# Patient Record
Sex: FEMALE | Race: WHITE
[De-identification: names, ages, dates, MRNs, and addresses within clinical notes are randomized per-mention and may not be internally consistent; named-entity substitution may affect disease eponyms.]

---

## 2017-09-22 ENCOUNTER — HOSPITAL ENCOUNTER (OUTPATIENT)
Dept: HOSPITAL 84 - D.CATH | Age: 57
LOS: 1 days | Discharge: HOME | End: 2017-09-23
Attending: INTERNAL MEDICINE
Payer: MEDICARE

## 2017-09-22 VITALS — SYSTOLIC BLOOD PRESSURE: 106 MMHG | DIASTOLIC BLOOD PRESSURE: 74 MMHG

## 2017-09-22 VITALS — SYSTOLIC BLOOD PRESSURE: 107 MMHG | DIASTOLIC BLOOD PRESSURE: 56 MMHG

## 2017-09-22 VITALS
BODY MASS INDEX: 37.97 KG/M2 | BODY MASS INDEX: 37.97 KG/M2 | HEIGHT: 68 IN | WEIGHT: 250.52 LBS | BODY MASS INDEX: 37.97 KG/M2

## 2017-09-22 VITALS — SYSTOLIC BLOOD PRESSURE: 118 MMHG | DIASTOLIC BLOOD PRESSURE: 64 MMHG

## 2017-09-22 VITALS — SYSTOLIC BLOOD PRESSURE: 110 MMHG | DIASTOLIC BLOOD PRESSURE: 97 MMHG

## 2017-09-22 DIAGNOSIS — I10: ICD-10-CM

## 2017-09-22 DIAGNOSIS — Z01.812: ICD-10-CM

## 2017-09-22 DIAGNOSIS — R94.30: ICD-10-CM

## 2017-09-22 DIAGNOSIS — I48.0: ICD-10-CM

## 2017-09-22 DIAGNOSIS — I25.119: Primary | ICD-10-CM

## 2017-09-22 LAB
ANION GAP SERPL CALC-SCNC: 9.7 MMOL/L (ref 8–16)
BASOPHILS NFR BLD AUTO: 0.4 % (ref 0–2)
BUN SERPL-MCNC: 13 MG/DL (ref 7–18)
CALCIUM SERPL-MCNC: 8 MG/DL (ref 8.5–10.1)
CHLORIDE SERPL-SCNC: 104 MMOL/L (ref 98–107)
CO2 SERPL-SCNC: 28.1 MMOL/L (ref 21–32)
CREAT SERPL-MCNC: 1.2 MG/DL (ref 0.6–1.3)
EOSINOPHIL NFR BLD: 1.9 % (ref 0–7)
ERYTHROCYTE [DISTWIDTH] IN BLOOD BY AUTOMATED COUNT: 13.8 % (ref 11.5–14.5)
GLUCOSE SERPL-MCNC: 96 MG/DL (ref 74–106)
HCT VFR BLD CALC: 39 % (ref 36–48)
HGB BLD-MCNC: 12.9 G/DL (ref 12–16)
IMM GRANULOCYTES NFR BLD: 0.3 % (ref 0–5)
LYMPHOCYTES NFR BLD AUTO: 27.5 % (ref 15–50)
MCH RBC QN AUTO: 30.3 PG (ref 26–34)
MCHC RBC AUTO-ENTMCNC: 33.1 G/DL (ref 31–37)
MCV RBC: 91.5 FL (ref 80–100)
MONOCYTES NFR BLD: 5.3 % (ref 2–11)
NEUTROPHILS NFR BLD AUTO: 64.6 % (ref 40–80)
OSMOLALITY SERPL CALC.SUM OF ELEC: 275 MOSM/KG (ref 275–300)
PLATELET # BLD: 171 10X3/UL (ref 130–400)
PMV BLD AUTO: 10.2 FL (ref 7.4–10.4)
POTASSIUM SERPL-SCNC: 3.8 MMOL/L (ref 3.5–5.1)
RBC # BLD AUTO: 4.26 10X6/UL (ref 4–5.4)
SODIUM SERPL-SCNC: 138 MMOL/L (ref 136–145)
WBC # BLD AUTO: 7 10X3/UL (ref 4.8–10.8)

## 2017-09-22 PROCEDURE — 93459 L HRT ART/GRFT ANGIO: CPT

## 2017-09-22 NOTE — NUR
INITIAL ROUNDS COMPLETED AT 1915 HRS.  PT DENIED ANY DISCOFORT.  ASSESSMETN
COMPLETED AT 2025 HRS.  VSS.  SR PER CM HR 63.  IV TO R HAND SL. R GROIN
CLEAN, DRY AND INTACT WITHOUT ANY BLEEDING, SWELLING OR BRUISING.  LUNGS CTA.
PT REQUESTING PM HOME MEDS.  SR LARA PAGED AT 2040 HRS.  PAGE RETURNS AND
NEW ORDERS RECEIVED AND NOTED.  PM MEDS GINVE. PT CURRENTLY WATCHING TV.
DENIES ANY DISCOMFORT.  SR UP X2,CALL LIGHT WITHIN REACH.

## 2017-09-22 NOTE — HEMODYNAMI
PATIENT:AMOS CANO                                MEDICAL RECORD: [id number]
: 62                                            LOCATION:D.CAT          
ACCT# [id number]                                       ADMISSION DATE: 17
 
 
 Generatedon:201713:25
Patient name: AMOS CANO Patient #: [id number] Visit #: [id number] SSN: :  Date of
study: 2017
Page: Of
Hemodynamic Procedure Report
****************************
Patient Data
Patient Demographics
Procedure consent was obtained
First Name: AMOS            Gender: Female
Last Name: JEROME            : 1960
[facility] Initial: JANETH      Age: 57 year(s)
Patient #: [id number]       Race: 
Visit #: [id number]
Accession #:
[id number]
Additional ID: [id number]
Contact details
Address: [address] [place] [address]  Phone: [phone]
State: [place]
City: [place]
Zip code: [address]
Past Medical History
Allergies: No known allergies
Admission
Admission Data
Admission Date: 2017   Admission Time: 10:39
Admit Source: Other
Lab Results
Lab Result Date: 2017  Lab Result Time: 11:38
Biochemistry
Name         Units    Result                Min      Max
BUN          mg/dl    13       --(--*-)--   7        18
Creatinine   mg/dl    1.2      --(---*)--   0.6      1.3
CBC
Name         Units    Result                Min      Max
Hematocrit   %        39       *-(----)--   42       54
Hemoglobin   g/dl     12.9     -*(----)--   13.5     17.5
Procedure
Procedure Types
Cath Procedure
Diagnostic Procedure
LHC
LHC w/Coronaries w/Grafts
PCI Procedure
SVG-BMS/FRIEDA Initial
Procedure Description
Procedure Date
Procedure Date: 2017
Procedure Start Time: 13:02
Procedure End Time: 13:24
Procedure Staff
Name                            Function
 
Valeriy Mckeon MD                Performing Physician
Daryn Morel RT                  Scrub
Larissa العراقي RT               Scrub
Wild Lara RN                Nurse
Tejinder Rodriguez RT                  Monitor
Procedure Data
Cath Procedure
Fluoroscopy
Diagnostic fluoroscopy      Total fluoroscopy Time: 5.2
time: 5.2 min               min
Diagnostic fluoroscopy      Total fluoroscopy dose:
dose: 1137 mGy              1137 mGy
Contrast Material
Contrast Material Type                       Amount (ml)
Isovue 300                                   114
Entry Location
Entry     Primary  Successful  Side  Size  Upsize Upsize Entry    Closure Succes
sful  Closure
Location                             (Fr)  1 (Fr) 2 (Fr) Remarks  Device        
      Remarks
Femoral                        Right 5 Fr  6 Fr                   Exoseal
artery                                     Short
Estimated blood loss: 10 ml
Diagnostic catheters
Device Type               Used For           End Catheter
Placement
Cordis 5Fr Pigtail        Procedure
Catheter (MP)
Cordis 5Fr JL 4.0         Procedure
Catheter (MP)
Cordis 5Fr 3DRC Catheter  Procedure
(MP)
Procedure Medications
Medication           Administration Route Dosage
Oxygen               NC                   2 l/min
Heparin Flush Bag    added to field       2 bags
(1000units/500ml NS)
0.9% NaCl            I.V.                 100 ml/hr
Fentanyl             I.V.                 50 mcg
Versed               I.V.                 1 mg
Fentanyl             I.V.                 50 mcg
Versed               I.V.                 1 mg
Fentanyl             I.V.                 50 mcg
Fentanyl             I.V.                 50 mcg
Heparin Bolus        I.V.                 4000 units
Hemodynamics
Rest
HGB: 12.9 (g/dl) Heart Rate: 66 (bpm)
Pressure Samples
Time     Site     Value (mmHg) Purpose      Heart      Use
Rate(bpm)
13:05    LV       97/32,40     Snapshot     66
Snapshots
Pre Cath      Intra         NCS           Post Cath
Vital Signs
Time     Heart  Resp   SPO2 etCO2   GI8bxeb NIBP (mmHg)  Rhythm  Pain    Sedatio
n
Rate   (ipm)  (%)  (mmHg)  (mmHg)                       Status  Level
(bpm)
12:52:19 83     17     100  0       0       128/85(109)  NSR     0 (11)  10(A)
 
, No
pain
12:57:12 59     16     100  0       0       122/78(96)   NSR     0 (11)  10(A)
, No
pain
13:02:07 59     18     96   0       0       120/69(113)  NSR     0 (11)  10(A)
, No
pain
13:06:58 63     17     97   0       0       112/74(105)  NSR     0 (11)  10(A)
, No
pain
13:12:48 64     16     92   0       0       143/112(134) NSR     0 (11)  9(A)
, No
pain
13:18:30 65     18     92   0       0       127/90(121)  NSR     0 (11)  9(A)
, No
pain
13:22:30 65     17     94   0       0       148/97(109)  NSR     0 (11)  9(A)
, No
pain
Medications
Time     Medication       Route  Dose  Verified Delivered Reason          Notes 
 Effectiveness
by       by
12:55:15 Oxygen           NC     2     Valeriy Rome    Per physician
l/min Linda Lara RN
12:55:26 Heparin Flush    added  2     Valeriy Rome    used for
Bag              to     bags  Linda Lara RN procedure
(1000units/500ml field
NS)
12:55:39 0.9% NaCl        I.V.   100   Valeriy Rome    Per physician
ml/hr Linda Lara RN
13:01:58 Fentanyl         I.V.   50    Valeriy Rome    for sedation
mcg   Linda Lara RN
13:02:04 Versed           I.V.   1 mg  Valeriy Rome    for sedation
Linda Lara RN
13:03:18 Fentanyl         I.V.   50    Valeriy Rome    for sedation
mcg   Linda Lara RN
13:03:23 Versed           I.V.   1 mg  Valeriy Rome    for sedation
Linda Lara RN
13:06:11 Fentanyl         I.V.   50    Valeriy Rome    for sedation
madyson Lara RN
13:11:47 Fentanyl         I.V.   50    Valeriy Soy    for sedation
[facility] [place]   Linda Lara RN
13:14:06 Heparin Bolus    I.V.   4000  Valeriy Rome    for
units Linda Lara RN anticoagulation
Procedure Log
Time     Note
12:33:06 Informed consent obtained and on chart
12:33:13 Admit Source: Other
12:33:31 Diagnostic Cath status Elective
12:33:34 Wild Lara RN sent for patient. Start room use.
12:33:35 Time tracking: Regular hours
12:33:40 Plan of Care:Hemodynamics will remain stable., Cardiac
rhythm will remain stable., Comfort level will be
maintained., Respiratory function will remain
adequate., Patient/ family verbilizes understanding of
procedure., Procedure tolerated without complication.,
Recovers from procedure without complications..
12:38:54 Lab Result : BUN 13 mg/dl
 
12:38:54 Lab Result : Hemoglobin 12.9 g/dl
12:38:54 Lab Result : Creatinine 1.2 mg/dl
12:38:54 Lab Result : Hematocrit 39 %
12:39:07 H&P Date Dictated: 2017 New H&P dictated by
physician..
12:45:47 Lab results completed and on chart.
12:47:33 Patient received from Pre/Post Procedure Room to CCL 1
Alert and oriented. Tansferred to table in Supine
position.
12:47:36 Warm blankets applied, and akash hugger turned on for
patient comfort.
12:47:38 Correct patient and procedure confirmed by team.
12:51:12 ECG and BP/O2 sat monitors applied to patient.
12:51:14 Vital chart was started
12:51:23 Rhythm: sinus rhythm
12:51:25 Pre-procedure instructions explained to patient.
12:51:34 Pre-op teaching completed and patient verbalized
understanding.
12:51:41 Family in patients room.
12:51:54 Is the patient allergic to Iodine/contrast media? No.
12:51:56 Is patient on blood thinner?Yes
12:52:00 **ACC** The patient was administered the following
blood thiners within the last 24 hours: **ACC**Plavix
12:52:03 Patient diabetic? No.
12:52:15 Patient not pregnant. Patient is over age 55.
12:52:18 ----Pre-sedation anethsthesia assessment.----
12:52:24 Previous problem with sedation/anesthesia? No ?
12:52:34 Snore? Yes
12:52:36 Sleep apnea? No
12:52:54 Deviated septum? No
12:53:00 Opens mouth fully? Yes
12:53:01 Sticks out tongue? Yes
12:53:10 Airway obstruction? No ?
12:53:16 Dentures? Yes IN TIGHT
12:53:49 IV patent on arrival in right forearm with 0.9% NaCl
at [facility].
12:55:02 Right groin area was prepped with chlora-prep and
draped in sterile fashion
12:55:15 Oxygen 2 l/min NC was administered by Wild Lara
RN; Per physician;
12:55:26 Heparin Flush Bag (1000units/500ml NS) 2 bags added to
field was administered by Wild Lara RN; used for
procedure;
12:55:39 0.9% NaCl 100 ml/hr I.V. was administered by Wild Lara RN; Per physician;
12:57:29 Alarms reviewed by R. N.
12:57:30 Sharps counted by scrub and verified by R.N.
12:57:32 Physician arrived
12:57:33 --------ALL STOP TIME OUT------
12:57:33 Final Timeout: patient, procedure, and site verified
with staff and physician. All members of the team are
in agreement.
12:57:38 Right groin site verified by team.
12:57:42 Physical assessment completed. ASA score P 2 - A
patient with mild systemic disease as per Valeriy Mckeon MD.
12:57:47 Sedation plan: IV Moderate Sedation Versed, Fentanyl
12:58:25 Use device set Femoral Dx
12:58:26 Acist Syringe opened to sterile field.
12:58:27 Bag Decanter opened to sterile field.
 
12:58:28 Medline Cath Pack opened to sterile field.
12:58:29 Terumo 5Fr Edelstein Sheath opened to sterile field.
12:58:29 St Patrick 260cm J .035 wire opened to sterile field.
12:58:31 Acist Hand Control opened to sterile field.
12:58:31 Acist Manifold opened to sterile field.
12:58:32 Diagnostic Infinity 5Fr Multipack catheter opened to
sterile field.
12:58:33 Tegaderm 4 x 4 opened to sterile field.
13:01:41 Zero performed for pressure channel P1
13:01:44 Zero performed for pressure channel P1
13:01:58 Fentanyl 50 mcg I.V. was administered by Wild Lara RN; for sedation;
13:02:01 Procedure started.
13:02:01 Full Disclosure recording started
13:02:04 Versed 1 mg I.V. was administered by Wild Lara RN;
for sedation;
13:02:06 Local anesthetic to right femoral artery with
Lidocaine 2% by Valeriy Mckeon MD.**INITIAL ACCESS
ONLY**
13:03:18 Fentanyl 50 mcg I.V. was administered by Wild Lara
RN; for sedation;
13:03:23 Versed 1 mg I.V. was administered by Wild Lara RN;
for sedation;
13:04:45 A 5 Fr sheath was inserted into the Right Femoral
artery
13:05:00 A Cordis 5Fr Pigtail Catheter (MP) was advanced over
the wire and used for Procedure.
13:05:47 LV hemodynamics recorded.
13:05:50 LV gram done using CHOW
13:05:56 EF : 50 %
13:06:06 Catheter removed.
13:06:11 Fentanyl 50 mcg I.V. was administered by Wild Lara
RN; for sedation;
13:06:28 A Cordis 5Fr JL 4.0 Catheter (MP) was advanced over
the wire and used for Procedure.
13:06:43 LCA angiography performed.
13:07:06 Catheter removed.
13:07:55 A Cordis 5Fr 3DRC Catheter (MP) was advanced over the
wire and used for Procedure.
13:08:16 LIMA to LAD angiography performed.
13:08:53 RCA angiography performed.
13:09:01 Catheter removed.
13:09:03 Medtronic Launcher 5Fr AR 2.0 guide catheter opened to
sterile field.
13:09:42 AR 2 ADVANCED
13:09:55 SVG to OM angiography performed.
13:10:49 Merit BasixCompak Inflation Kit opened to sterile
field.
13:10:56 Medtronic Launcher 6Fr AR 2.0 guide catheter opened to
sterile field.
13:11:33 Abbott BMW Universal II J-Tip 190cm wire opened to
sterile field.
13:11:47 Fentanyl 50 mcg I.V. was administered by Wild Lara
RN; for sedation;
13:12:14 Terumo 6Fr Edelstein Sheath opened to sterile field.
13:13:00 SVG to RCA angiography performed.
13:13:03 Catheter removed.
13:13:14 Proceeding to intervention.
13:13:43 Procedure type changed to Cath procedure, Diagnostic
procedure, LHC, LHC w/Coronaries w/Grafts, PCI
 
procedure, SVG-BMS/FRIEDA Initial
13:13:57 Sheath upsized to a 6 Fr Short.
13:14:06 Heparin Bolus 4000 units I.V. was administered by
Wild Lara RN; for anticoagulation;
13:14:10 6 Fr AR 2 guide catheter was inserted over the wire
13:14:48 190 BMW wire advanced.
13:15:05 Wire advanced across lesion.
13:18:00 Inflation Number: 1 A Shankar RX 3.0 x 15 stent was
prepped and advanced across the Aorta Left -> 1st Ob
Vero. The stent was deployed at 13 MONALISA for 0:10
(min:sec).
13:18:06 Stent catheter was removed intact over wire.
13:18:07 Wire removed.
13:18:07 Guide catheter removed.
13:19:05 Cordis 6Fr Exoseal opened to sterile field.
13:19:14 Sheath removed intact; hemostasis achieved with
Exoseal to the Right Femoral artery.
13:19:17 Procedure ended.(Physican Out)
13:20:19 Fluoroscopy time 05.20 minutes.
13:20:25 Flurop Dose total: 1137
13:20:25 Fluoroscopy dose: 1137 mGy
13:20:31 Contrast amount:Isovue 300 114ml.
13:20:33 Sharps counted by scrub and verified by R.N.
13:20:42 Insertion/operative site no bleeding no hematoma.
13:20:46 Post-op/insertion site Right Femoral artery dressed
using a 4 x 4 and Tegaderm.
13:20:51 Post right femoral artery:stable
13:21:34 Post-procedure physical assessment completed. ASA
score P 2 - A patient with mild systemic disease as
per Valeriy Mckeon MD.
13:21:40 Post procedure rhythm: sinus rhythm
13:21:43 Estimated blood loss: 10 ml
13:21:51 Post procedure instruction explained to
patient.Patient verbalizes understanding.
13:21:52 Patient needs reinforcement of post procedure
teaching.
13:21:53 Procedure and supply charges have been captured,
reviewed, submitted and are correct.
13:24:07 Vital chart was stopped
13:24:07 See physician's report for complete and final results.
13:24:10 Report given to PCU.
13:24:19 Patient transfered to PCU with Bed.
13:24:23 Procedure ended.
13:24:23 Full Disclosure recording stopped
13:24:26 End room use (Document Last)
Intervention Summary
Intervention Notes
Time     ActionType  Lesion and  Equipment Action#  Pressure  Duration
Attributes  Used
13:18:00 Place stent Aorta Left  [place] RX   1        13        00:10
-> 1st Ob   3.0 x 15
Vero        stent
Device Usage
Item Name   Manufacture  Quantity  Catalog       Hospital Part     Current Minim
al Lot# /
Number        Charge   Number   Stock   Stock   Serial#
Code
Acist       Acist        1         20230         734734   722874   816969  20
Syringe     Medical
Systems Inc
 
Bag         Microtek     1         S         788242   52333    161849  5
Decanter    Medical Inc.
Medline     Cardinal     1         [id number]     268655 60065    569474  5
Cath Pack   Hotelscan
Terumo 5Fr  Terumo       1         [id number]        954846   509617   138069  40
Edelstein
Sheath
St Patrick     St Patrick      1         387170        596725   480798   519674  30
260cm J
.035 wire
Acist Hand  Acist        1         84091         324997   788009   761495  5
Control     Medical
Systems Inc
Acist       Acist        1         71713         262567   514477   123386  5
Manifold    Medical
Systems Inc
Diagnostic  Cardinal     1         [id number]        853138   71032    483625  30
Infinity    Health
5Fr
Multipack
catheter
Tegaderm 4  3M           1         1626W         232020   352078   670587  5
x 4
Cordis 5Fr  Cardinal     1                                         278344  5
Pigtail     Health
Catheter
(MP)
Cordis 5Fr  Cardinal     1                                         959271  5
JL 4.0      Health
Catheter
(MP)
Cordis 5Fr  Cardinal     1                                         423847  5
3DRC        Health
Catheter
(MP)
Medtronic   Medtronic    1         [id number]       184492   123954   990650  1
Launcher
5Fr AR 2.0
guide
catheter
Merit       Merit        1         [id number]        395411   456345   059095  15
BasixCompak Medical
Inflation
Kit
Medtronic   Medtronic    1         [id number]       512590   22363    230651  1
Launcher
6Fr AR 2.0
guide
catheter
Abbott BMW  Abbott       1         [id number]      589764   14122    469401  5
[place]   Vascular
II J-Tip
190cm wire
Terumo 6Fr  Terumo       1         [id number]        925201   938013   525381  40
Edelstein
Sheath
Shankar RX 3.0 Medtronic    1         [id number]  827754   0352537  302581  5    
   [id number]
x 15 stent
Cordis 6Fr  Cardinal     1                  262066   650243   003114  10
 
[facility]
Signature Audit [place]
Stage           Time        Signature      Unsigned
Intra-Procedure 2017   Tejinder Rodriguez
1:25:24 PM  RT(R) (CV)
Signatures
Monitor : Tejinder Rodriguez RT Signature :
______________________________
Date : ______________ Time :
______________
 
 
 
 
 
 
 
 
 
 
 
 
 
 
 
 
 
 
 
 
 
 
 
 
 
 
 
 
 
 
 
 
 
 
 
 
 
 
 
 
 
 
 
 
 
[facility]                                          
[address] [place] [address]

## 2017-09-22 NOTE — HP
PATIENT: AMOS CANO                               MEDICAL RECORD: [id number]
ACCOUNT: [id number]                                    LOCATION:D.CAT         
: 62                                            ADMISSION DATE: 17
                                                         
 
                             HISTORY AND PHYSICAL EXAMINATION
 
 
DIAGNOSES:
1.  Angina.
2.  Abnormal nuclear stress test.
3.  Paroxysmal atrial fibrillation.
4.  Hypertension.
 
HISTORY OF PRESENT ILLNESS:  Ms. Cano presents with anginal chest discomfort. 
Underwent risk stratification with stress testing revealing anterior perfusion
defect, now brought for cardiac catheterization.
 
REVIEW OF SYSTEMS:  The patient reports easy bruising but reports no swollen
glands.  The patient reports no fever, no night sweats, no significant weight
gain, no significant weight loss.  No significant exercise tolerance.  The
patient reports no dry eyes, no irritation, no vision change.  Patient reports
no difficulty hearing and no ear pain.  Patient reports no frequent nose bleeds
or nose and sinus problems.  Patient reports no arm pain on exertion.  No
shortness of breath while lying down.  No history of heart murmur.  Patient
reports no cough, no wheezing or coughing up blood.  Patient reports no
abdominal pain, no vomiting.  Normal appetite.  No diarrhea and not vomiting
blood.  No nausea and no constipation.  Patient reports no incontinence.  No
difficulty urinating.  No hematuria.  No increased frequency.  Patient reports
no muscle aches.  No weakness, no arthralgias, no back pain.  No swelling of the
extremities.  Patient reports no abnormal mole, no jaundice, no rashes.  Reports
no loss of consciousness.  No weakness and no numbness.  No seizures, dizziness,
or headaches.  The patient reports no depression, no sleep disturbance, feeling
safe in a relationship and no alcohol abuse.  Patient reports on fatigue. 
Reports no runny nose or sinus pressure.  No itching, no hives, and no frequent
sneezing.
 
PHYSICAL EXAMINATION:
GENERAL APPEARANCE:  Well-nourished, well-developed, appears stated age.  Level
of distress, comfortable.
PSYCHIATRIC:  Mental status, alert, normal affect.  Orientation, oriented to
time, place and person.
EYES:  Lids and conjunctiva, noninjected.  No discharge, no pallor.
ENT:  Lips, teeth, gums, normal dentition.  Oropharynx, no cyanosis, no pallor.
NECK:  Carotid arteries, bilateral normal upstroke, no bruits, no thrills.
JUGULAR VEINS:  No jugular venous pressure or distention.
CERVICAL LYMPH NODES:  Nontender, nonenlarged.
THYROID:  Not enlarged.  Nontender.  No nodules.
LUNGS:  Respiratory effort, unlabored.
CHEST:  Normal curvature.  No thoracic deformity.  No chest wall tenderness. 
Percussion, resonant.  Auscultation, clear.  No wheezes, no rales, no rhonchi.
CARDIOVASCULAR:  Precordial exam, nondisplaced.  No heaves or pericardial
thrills.  Rate and rhythm, regular.  Heart sounds, normal S1, normal S2.  No S3,
no gallop, no rub.  Systolic murmur, not heard.  Diastolic murmur, not heard.
EXTREMITIES:  No cyanosis, no edema.  Peripheral pulses, full and equal in all
extremities, except as noted.  No bruits appreciated.
ABDOMEN:  Soft, nondistended.  Normal aorta.  No bruit.  Nontender.  No masses. 
Liver, nontender, no hepatomegaly.  Spleen, nontender, no splenomegaly.
 
 
 
HISTORY AND PHYSICAL                           [id number]    AMOS CANO       
 
 
MUSCULOSKELETAL:  No joint tenderness.  No joint swelling.  No erythema.
NEUROLOGICAL:  Normal gait, normal strength, normal tone.
SKIN:  Warm and dry.
 
OVERALL IMPRESSION:  Chest pain compatible with angina and abnormal nuclear
stress test.  Most likely, she has hemodynamically significant coronary artery
disease.  We will proceed with coronary angiography.  Further care depends upon
findings of the angiography.
 
TRANSINT:[id number] Voice Confirmation ID: [id number] DOCUMENT ID: [id number]
 
 
                                           
                                           JEFF LABOY MD             
 
 
 
 
 
 
 
 
 
 
 
 
 
 
 
 
 
 
 
 
 
 
 
 
 
 
 
 
 
 
 
 
 
CC:                                                             [id number]
DICTATION DATE: 17 1258     :     17 1324      [facility]                                          
[address] [place] [address]

## 2017-09-22 NOTE — OP
PATIENT NAME:  AMOS CANO                         MEDICAL RECORD: [id number]
:62                                             LOCATION:D.M2     D.2122
                                                         ADMISSION DATE:        
SURGEON:  JEFF LABOY MD             
 
 
DATE OF OPERATION:  2017
 
PROCEDURES:
1.  PTCA stent left circumflex through the vein graft.
2.  Left heart catheterization.
3.  Selective coronary angiography.
4.  Vein graft angiography.
5.  LIMA angiography.
 
INDICATION:  Unstable angina.
 
PROCEDURE IN DETAIL:  After informed consent was obtained and after detailed
explanation of risks, benefits as well as alternative therapies, the patient
elected to proceed with angiogram and angioplasty.  The right femoral area was
prepped and draped in normal sterile fashion.  The right femoral artery was
cannulated via modified Seldinger technique with placement of 6-Stateless sheath. 
All catheters exchanged through this sheath.
 
FINDINGS:  The left ventriculogram was performed in standard 30-degree CHOW view,
reveals good cardiac wall motion throughout all segments.  Overall ejection
fraction estimated at 50%.
 
SELECTIVE CORONARY ANGIOGRAPHY:
1.  Left main showed no significant angiographic disease.
2.  Left anterior descending has 90% stenosis proximally.
3.  Vein graft to the LAD diagonal is widely patent.
4.  LIMA to the distal LAD is widely patent.
5.  Left circumflex is totally occluded.
6.  Vein graft to the circumflex is patent; however, there is an 80% stenosis in
the circumflex after the vein graft.
7.  Right coronary artery is totally occluded.
8.  Vein graft to the right coronary artery is widely patent.  Distal right
coronary artery is widely patent.
 
PTCA STENT OF THE LEFT CIRCUMFLEX THROUGH THE VEIN GRAFT:  The stent used was a
3.0 x 15 mm [place].  Result was 0% residual stenosis.
 
OVERALL IMPRESSION:  Successful percutaneous transluminal coronary angioplasty
stent of the left circumflex through the patent vein graft going from 85%
initial stenosis to 0% residual.
 
TRANSINT:[id number] Voice Confirmation ID: [id number] DOCUMENT ID: [id number]
                                           
                                           JFEF LABOY MD             
CC:                                                             [id number]
DICTATION DATE: 17 1321     :     17 1452      [facility]                                          
[address] [place] [address]

## 2017-09-23 VITALS — SYSTOLIC BLOOD PRESSURE: 110 MMHG | DIASTOLIC BLOOD PRESSURE: 66 MMHG

## 2017-09-23 VITALS — SYSTOLIC BLOOD PRESSURE: 94 MMHG | DIASTOLIC BLOOD PRESSURE: 55 MMHG

## 2017-09-23 VITALS — SYSTOLIC BLOOD PRESSURE: 96 MMHG | DIASTOLIC BLOOD PRESSURE: 61 MMHG

## 2017-09-23 NOTE — NUR
WENT OVER DC PAPERWORK WITH PT PT VERBALIZES UNDERSTANDING. DC IV WITH CATH
TIP INTACT DC TELE AND RETURNED TO MONITOR TECH. PT HAS WRITTEN SCRIPTS.
WAITING ON HER DAUGHTER TO PICK HER UP

## 2019-02-08 ENCOUNTER — HOSPITAL ENCOUNTER (OUTPATIENT)
Dept: HOSPITAL 84 - D.CATH | Age: 59
Discharge: HOME | End: 2019-02-08
Attending: INTERNAL MEDICINE
Payer: MEDICARE

## 2019-02-08 VITALS — DIASTOLIC BLOOD PRESSURE: 64 MMHG | SYSTOLIC BLOOD PRESSURE: 102 MMHG

## 2019-02-08 VITALS
WEIGHT: 240.5 LBS | BODY MASS INDEX: 36.45 KG/M2 | HEIGHT: 68 IN | WEIGHT: 240.5 LBS | BODY MASS INDEX: 36.45 KG/M2 | HEIGHT: 68 IN

## 2019-02-08 DIAGNOSIS — Z01.812: ICD-10-CM

## 2019-02-08 DIAGNOSIS — Z95.1: ICD-10-CM

## 2019-02-08 DIAGNOSIS — Z95.5: ICD-10-CM

## 2019-02-08 DIAGNOSIS — I25.119: Primary | ICD-10-CM

## 2019-02-08 LAB
ANION GAP SERPL CALC-SCNC: 13 MMOL/L (ref 8–16)
BUN SERPL-MCNC: 13 MG/DL (ref 7–18)
CALCIUM SERPL-MCNC: 8.3 MG/DL (ref 8.5–10.1)
CHLORIDE SERPL-SCNC: 106 MMOL/L (ref 98–107)
CO2 SERPL-SCNC: 28 MMOL/L (ref 21–32)
CREAT SERPL-MCNC: 0.9 MG/DL (ref 0.6–1.3)
ERYTHROCYTE [DISTWIDTH] IN BLOOD BY AUTOMATED COUNT: 13.9 % (ref 11.5–14.5)
GLUCOSE SERPL-MCNC: 89 MG/DL (ref 74–106)
HCT VFR BLD CALC: 39.9 % (ref 36–48)
HGB BLD-MCNC: 13.1 G/DL (ref 12–16)
LYMPHOCYTES NFR BLD AUTO: 38.6 % (ref 15–50)
MCH RBC QN AUTO: 29.6 PG (ref 26–34)
MCHC RBC AUTO-ENTMCNC: 32.8 G/DL (ref 31–37)
MCV RBC: 90.1 FL (ref 80–100)
NEUTROPHILS NFR BLD AUTO: 52.8 % (ref 40–80)
OSMOLALITY SERPL CALC.SUM OF ELEC: 283 MOSM/KG (ref 275–300)
PLATELET # BLD: 189 10X3/UL (ref 130–400)
PMV BLD AUTO: 9.4 FL (ref 7.4–10.4)
POTASSIUM SERPL-SCNC: 4 MMOL/L (ref 3.5–5.1)
RBC # BLD AUTO: 4.43 10X6/UL (ref 4–5.4)
SODIUM SERPL-SCNC: 143 MMOL/L (ref 136–145)
WBC # BLD AUTO: 4.6 10X3/UL (ref 4.8–10.8)

## 2019-02-08 NOTE — NUR
1230 HOB IS ELEVATED 30 DEGREES, DRESSING TO RIGHT GROIN IS CDI, AREA
IS SOFT AND NONTENDER. PEDAL PULSES PALPABLE. PT IS ALERT AND DENIES
NEEDS AT THIS TIME.
1244 HOB FULLY ELEVATED AND SANDWICH SERVED. DRESSING REMAINS CDI TO
RIGHT GROIN, AREA IS SOFT AND NONTENDER. PEDAL PULSES PALPABLE.
FAMILY AT BEDSIDE, CALL LIGHT IN REACH.

## 2019-02-08 NOTE — NUR
HOB IS FLAT, VSS, DRESSING CDI TO RIGHT GROIN, AREA IS SOFT AND
NONTENDER. PEDAL PULSES PALPABLE. RESP WITH EASE. PT DENIES NEEDS AT
THIS TIME.

## 2019-02-08 NOTE — NUR
1128 RECEIVED PT  FROMA CATH LAB. PT IS SLEEPING AND AWAKENS EASILY
TO VERBAL. DRESSING CDI TO RIGHT GROIN, AREA IS SOFT AND NONTENDER.
PEDAL PULSES PALPABLE. VSS, HOB IS FLAT, FAMILY AT BEDSIDE.

## 2019-02-08 NOTE — HEMODYNAMI
PATIENT:AMOS CANO                                MEDICAL RECORD: [id number]
: 62                                            LOCATION:D.CAT          
ACCT# [id number]                                       ADMISSION DATE: 19
 
 
 Generatedon:201911:19
Patient name: AMOS CANO Patient #: [id number] Visit #: [id number] SSN: :  Date
of study: 2019
Page: Of
Hemodynamic Procedure Report
****************************
Patient Data
Patient Demographics
Procedure consent was obtained
First Name: AMOS            Gender: Female
Last Name: JEROME            : 1960
[facility] Initial: JANETH      Age: 58 year(s)
Patient #: [id number]       Race: 
Visit #: [id number]
Accession #:
[id number]
Additional ID: [id number]
Contact details
Address: [address] [place] [address]  Phone: [phone]
State: [place]
City: [place]
Zip code: [address]
Past Medical History
Allergies: No known allergies
Admission
Admission Data
Admission Date: 2019    Admission Time: 8:45
Height (in.): 68            BSA: 2.2 (m2)
Height (cm.): 172.72        BMI: 36.34 (kg/m2)
Weight (lbs.): 239
Weight (kg.): 108.41
Lab Results
Lab Result Date: 2019   Lab Result Time: 9:30
Biochemistry
Name         Units    Result                Min      Max
BUN          mg/dl    13       --(--*-)--   7        18
Creatinine   mg/dl    0.9      --(-*--)--   0.6      1.3
CBC
Name         Units    Result                Min      Max
Hematocrit   %        39.9     -*(----)--   42       54
Hemoglobin   g/dl     13.1     -*(----)--   13.5     17.5
Procedure
Procedure Types
Cath Procedure
Diagnostic Procedure
LHC
LHC w/Coronaries w/Grafts
Sedation Charges
Moderate Sedation up to 15 minutes
Procedure Description
Procedure Date
Procedure Date: 2019
Procedure Start Time: 11:04
 
Procedure End Time: 11:19
Procedure Staff
Name                            Function
Valeriy Mckeon MD                Performing Physician
Yobany Sotelo RT                  Monitor
Larissa العراقي RT               Scrub
Alaina Baum RN                Nurse
Procedure Data
Cath Procedure
Fluoroscopy
Diagnostic fluoroscopy      Total fluoroscopy Time: 3.9
time: 3.9 min               min
Diagnostic fluoroscopy      Total fluoroscopy dose: 819
dose: 819 mGy               mGy
Contrast Material
Contrast Material Type                       Amount (ml)
Isovue 300                                   62
Entry Location
Entry     Primary  Successful  Side  Size  Upsize Upsize Entry    Closure Succes
sful  Closure
Location                             (Fr)  1 (Fr) 2 (Fr) Remarks  Device        
      Remarks
Femoral                        Right 5 Fr                         Exoseal
artery
Estimated blood loss: 5 ml
Diagnostic catheters
Device Type               Used For           End Catheter
Placement
MULTIPACK Pigtail 5 Fr    Procedure
catheter
MULTIPACK JL 4.0 5Fr      Procedure
catheter
DIAGNOSTIC AR2 MOD 5 Fr   Procedure
catheter (372569F)
DIAGNOSTIC IM 5Fr         Procedure
catheter (052950Y)
Procedure Complications
No complications
Procedure Medications
Medication           Administration Route Dosage
0.9% NaCl            I.V.                 100 ml/hr
Oxygen               etCO2 Nasal cannula  2 l/min
Lidocaine 2%         added to field       20
Heparin Flush Bag    added to field       2 bags
(1000units/500ml NS)
Versed               I.V.                 2 mg
Fentanyl             I.V.                 50 mcg
Versed               I.V.                 2 mg
Fentanyl             I.V.                 50 mcg
Hemodynamics
Rest
BSA: 2.2 (m2) HGB: 13.1 (g/dl) O2 Consumption: Estimated: 191.78 (ml/min) O2 Con
sumption
indexed: Estimated:87.17 (ml/min/m) Heart Rate: 48 (bpm)
Snapshots
Pre Cath      Intra         NCS           Post Cath
Vital Signs
Time     Heart  Resp   SPO2 etCO2   NIBP       Rhythm  Pain    Sedation
Rate   (ipm)  (%)  (mmHg)  (mmHg)             Status  Level
(bpm)
 
10:34:52 48     14     98   32.9    119/70(97) SB      0 (11)  10(A)
, No
pain
10:39:10 47     11     100  34.4    122/70(93) SB      0 (11)  10(A)
, No
pain
10:43:28 50     13     99   26      114/72(82) SB      0 (11)  10(A)
, No
pain
10:47:40 50     13     97   29.8    108/68(82) SB      0 (11)  10(A)
, No
pain
10:51:54 50     17     98   32.1    104/67(87) SB      0 (11)  10(A)
, No
pain
10:56:08 50     12     97   38.3    110/65(79) SB      0 (11)  10(A)
, No
pain
11:00:24 49     17     98   34.4    105/65(85) SB      0 (11)  10(A)
, No
pain
11:04:34 52     11     96   39.8    109/71(88) SB      0 (11)  10(A)
, No
pain
11:08:47 53     10     97   39      114/68(95) SB      0 (11)  9(A)
, No
pain
11:12:57 55     27     97   39.8    107/75(91) SB      0 (11)  10(A)
, No
pain
11:17:09 56     12     97   37.5    119/72(92) SB      0 (11)  10(A)
, No
pain
Medications
Time     Medication       Route   Dose  Verified Delivered Reason     Notes  Eff
ectiveness
by       by
10:32:40 0.9% NaCl        I.V.    100   Valeriy  Alaina     used for
ml/hr Linda Baum   procedure
RN
10:32:46 Oxygen           etCO2   2     Valeriy  Alaina     used for
Nasal   l/min Linda Baum   procedure
cannula                RN
10:33:01 Lidocaine 2%     added   20ml  Valeriy  Alaina     for local
to      vial  Linda Baum   anesthetic
field                  RN
10:33:06 Heparin Flush    added   2     Valeriy  Alaina     used for
Bag              to      bags  Linda Baum   procedure
(1000units/500ml field                  RN
NS)
10:58:39 Versed           I.V.    2 mg  Valeriy  Alaina     for
Linda Baum   sedation
RN
10:58:46 Fentanyl         I.V.    50    Valeriy  Alaina     for
mcg   Linda Baum   sedation
RN
11:04:17 Versed           I.V.    2 mg  Valeriy  Alaina     for
Linda Baum   sedation
RN
11:04:21 Fentanyl         I.V.    50    Valeriy  Alaina     for
 
mcg   Linda Baum   sedation
RN
Procedure Log
Time     Note
10:19:28 Signed procedure consent form obtained from patient.
10:19:29 Diagnostic Cath status Elective
10:19:30 Time tracking: Regular hours (M-F 7:00 - 5:00)
10:19:34 Plan of Care:Hemodynamics will remain stable., Cardiac
rhythm will remain stable., Comfort level will be
maintained., Respiratory function will remain
adequate., Patient/ family verbilizes understanding of
procedure., Procedure tolerated without complication.,
Recovers from procedure without complications..
10:19:45 H&P Date Dictated: 2019 Within 30 days and on
chart., H&P Addendum completed by physician on day of
procedure. (MUST COMPLETE FOR ALL OUTPATIENTS).
10:19:51 Patient allergic to No known allergies
10:20:02 Patient Height : 68 inches
10:20:06 Patient Weight : 239 lbs
10:20:10 Yobany Sotelo RT(R) sent for patient. Start room use.
10:26:38 Patient received from Pre/Post Procedure Room to CCL 1
Alert and oriented. Tansferred to table in Supine
position.
10:26:39 Warm blankets applied, and akash hugger turned on for
patient comfort.
10:26:39 Correct patient and procedure confirmed by team.
10:26:40 ECG and BP/O2 sat monitors applied to patient.
10:32:29 Vital chart was started
10:32:40 0.9% NaCl 100 ml/hr I.V. was administered by Alaina Baum RN; used for procedure;
10:32:46 Oxygen 2 l/min etCO2 Nasal cannula was administered by
Alaina Baum RN; used for procedure;
10:33:01 Lidocaine 2% 20ml vial added to field was administered
by Alaina Baum RN; for local anesthetic;
10:33:06 Heparin Flush Bag (1000units/500ml NS) 2 bags added to
field was administered by Alaina Baum RN; used for
procedure;
10:40:37 Baseline sample Acquired.
10:40:40 Rhythm: sinus rhythm
10:40:45 Full Disclosure recording started
10:40:54 Pre-procedure instructions explained to patient.
10:40:54 Pre-op teaching completed and patient verbalized
understanding.
10:41:15 Family in waiting room.
10:41:17 Patient NPO since Midnight.
10:41:18 Is the patient allergic to Iodine/contrast media? No.
10:41:27 Is patient on blood thinner?Yes
10:41:29 **ACC** The patient was administered the following
blood thiners within the last 24 hours: **ACC**Plavix
10:41:35 Patient diabetic? No.
10:41:38 Previous problem with sedation/anesthesia? No ?
10:41:45 Snore? Yes
10:41:50 Sleep apnea? No
10:41:51 Deviated septum? No
10:41:51 Opens mouth fully? Yes
10:41:52 Sticks out tongue? Yes
10:41:54 Airway obstruction? No ?
10:41:58 Dentures? Yes in tight
10:42:02 Pre procedure: right dorsailis pedis pulse 2+ Normal;
easily identifiable; not easily obliterated
 
10:42:05 Patient pain scale 0/10 ?.
10:42:11 IV patent on arrival in left forearm with 0.9% NaCl at
[facility].
10:43:29 Lab Result : Creatinine 0.9 mg/dl
10:43:29 Lab Result : BUN 13 mg/dl
10:43:29 Lab Result : Hematocrit 39.9 %
10:43:29 Lab Result : Hemoglobin 13.1 g/dl
10:43:31 Lab results completed and on chart.
10:43:34 Right groin area was prepped with chlora-prep and
draped in sterile fashion
10:43:35 Alarms reviewed by R. N.
10:43:35 Sharps counted by scrub and verified by R.N.
10:43:37 Use device set Femoral Dx
10:43:38 ACIST Syringe (46677) opened to sterile field.
10:43:39 Bag Decanter (2002S) opened to sterile field.
10:43:39 Medline Cath Pack ([id number]) opened to sterile field.
10:43:40 ACIST Hand Control (87947) opened to sterile field.
10:43:40 ACIST Manifold (49316) opened to sterile field.
10:43:42 Tegaderm 4 x 4 (1626W) opened to sterile field.
10:43:43 SHEATH 5FR Simla ([id number]) opened to sterile field.
10:43:44 DIAGNOSTIC WIRE .035 260cm J wire (010879) opened to
sterile field.
10:43:45 DIAGNOSTIC Multipack 5Fr catheter set ([id number]) opened
to sterile field.
10:44:12 Zero performed for pressure channel P1
10:58:06 Physician arrived
10:58:07 --------ALL STOP TIME OUT------
10:58:07 Final Timeout: patient, procedure, and site verified
with staff and physician. All members of the team are
in agreement.
10:58:09 Right groin site verified by team.
10:58:12 Fire Safety Assessment: A--An alcohol-based skin
anteseptic being used preoperatively., C--Open oxygen
or nitrous oxide is being used., D--An ESU, laser, or
fiber-optic light is being used.
10:58:15 Physical assessment completed. ASA score P 2 - A
patient with mild systemic disease as per Valeriy Mckeon MD.
10:58:17 Sedation plan: IV Moderate Sedation Medication:Versed,
Fentanyl
10:58:39 Versed 2 mg I.V. was administered by Alaina Baum RN;
for sedation;
10:58:46 Fentanyl 50 mcg I.V. was administered by Alaina Baum
RN; for sedation;
11:04:17 Versed 2 mg I.V. was administered by Alaina Baum RN;
for sedation;
11:04:21 Fentanyl 50 mcg I.V. was administered by Alaina Baum
RN; for sedation;
11:04:30 Procedure started.
11:04:33 Local anesthetic to right femoral artery with
Lidocaine 2% by Valeriy Mckeon MD.**INITIAL ACCESS
ONLY**
11:04:44 A 5 Fr sheath was inserted into the Right Femoral
artery
11:05:45 A MULTIPACK Pigtail 5 Fr catheter was advanced over
the wire and used for Procedure.
11:06:18 LV gram done using CHOW
11:06:21 Injector settings: Ml/sec: 10, Volume: 20,
11:06:22 LV hemodynamics recorded.
11:06:26 EF : 50 %
 
11:06:28 Catheter exchanged over wire.
11:06:36 A MULTIPACK JL 4.0 5Fr catheter was advanced over the
wire and used for Procedure.
11:07:41 LCA angiography performed.
11:08:04 Catheter exchanged over wire.
11:08:48 A DIAGNOSTIC AR2 MOD 5 Fr catheter ([id number]) was
advanced over the wire and used for Procedure.
11:09:36 SVG to OM angiography performed.
11:09:53 SVG to Circ angiography performed.
11:10:36 RCA angiography performed.
11:12:53 SVG to RCA angiography performed.
11:13:05 A DIAGNOSTIC IM 5Fr catheter ([id number]) was advanced
over the wire and used for Procedure.
11:14:46 LIMA to LAD angiography performed.
11:14:49 Catheter removed.
11:14:55 EXOSEAL 5Fr () opened to sterile field.
11:15:02 Sheath removed intact; hemostasis achieved with
Exoseal to the Right Femoral artery.
11:15:04 Procedure ended.(Physican Out)
11:16:30 Fluoroscopy time 03.90 minutes.
11:16:36 Flurop Dose total: 819
11:16:36 Fluoroscopy dose: 819 mGy
11:16:40 Contrast amount:Isovue 300 62ml.
11:16:41 Sharps counted by scrub and verified by R.N.
11:16:42 Insertion/operative site no bleeding no hematoma.
11:16:45 Post-op/insertion site Right Femoral artery dressed
using a 4 x 4 and Tegaderm.
11:16:48 Post right femoral artery:stable, soft, clean and dry
11:16:49 Post Procedure Pulses reassessed and unchanged
11:16:51 Post-procedure physical assessment completed. ASA
score P 2 - A patient with mild systemic disease as
per Valeriy Mckeon MD.
11:17:04 Post procedure rhythm: sinus rhythm
11:17:29 Estimated blood loss: 5 ml
11:17:31 Post procedure instruction explained to
patient.Patient verbalizes understanding.
11:17:31 Patient needs reinforcement of post procedure
teaching.
11:18:12 Procedure type changed to Cath procedure, Diagnostic
procedure, LHC, LHC w/Coronaries w/Grafts, Sedation
Charges, Moderate Sedation up to 15 minutes
11:18:50 Procedure and supply charges have been captured,
reviewed, submitted and are correct.
11:18:50 Vital chart was stopped
11:18:54 Procedure Complication : No complications
11:18:55 See physician's report for complete and final results.
11:18:57 Report given to Pre/Post Procedure Room.
11:18:59 Patient transfered to Pre/Post Procedure Room with
Stretcher.
11:19:01 Procedure ended.
11:19:01 Full Disclosure recording stopped
11:19:06 End room use (Document Last)
Device Usage
Item Name   Manufacture  Quantity  Catalog    Hospital Part    Current Minimal L
ot# /
Number     Charge   Number  Stock   Stock   Serial#
Code
ACIST       Acist        1         80819      000828   904929  555541  20
Syringe     Tele[employer] Systems
(74461)     Systems Inc
 
Bag         Microtek     1               735726   74250   356199  5
Dec[employer]ision [employer]
()
Medline     Medline      1         [id number]  043866   66008   372108  5
Cath Pack
([id number])
ACIST Hand  Acist        1         54266      725320   402996  022281  5
Control     Medical
(53597)     Systems Inc
ACIST       Acist        1         01689      740899   897245  359036  5
Manifold    Medical
(98744)     Systems Inc
Tegaderm 4  3M           1         1626W      868879   011105  751511  5
x 4 (1626W)
SHEATH 5FR  Terumo       1         [id number]     179306   899542  862379  5
Simla
(IIN521)
DIAGNOSTIC  [place]      1         356756     364833   093259  444421  30
WIRE .035
260cm J
wire
(656491)
DIAGNOSTIC  Cardinal     1         KH0342     171355   76703   894525  30
Multipack   Health
5Fr
catheter
set
(HZ1790)
MULTIPACK   Cardinal     1                                     158111  5
Pigtail 5   Health
Fr catheter
MULTIPACK   Cardinal     1                                     078531  5
JL 4.0 5Fr  Health
catheter
DIAGNOSTIC  Cardinal     1         557193U    020184   752934  976195  20
AR2 MOD 5   Health
Fr catheter
(290491U)
DIAGNOSTIC  Cardinal     1         822452G    458150   062567  050216  5
IM 5Fr      Health
catheter
(222165K)
EXOSEAL 5Fr Cardinal     1               845228   565962  134897  10
()     Health
Signature Audit [place]
Stage           Time        Signature      Unsigned
Intra-Procedure 2019    Yobany Sotelo
11:19:31 AM RT(R)
Signatures
Monitor : Yobany Sotelo RT Signature :
______________________________
Date : ______________ Time :
______________
 
 
[place] [place] [address]

## 2019-02-08 NOTE — NUR
1310 DRESSING TO RIGHT GROIN REMAINS CDI, AREA IS SOFT AND NONTENDER.
IV DC'D WITH CATH INTACT AND PT IS DRESSING FOR DC TO HOME WTH ASSIST
FROM NURSE. DC INSTRUCTIONS HAVE BEEN REVIEWED WITH PT WHO VERBALIZES
UNDERSTANDING. WRITTEN COPIES PROVIDED.
1320 ASSISTED PT TO THE BATHROOM VIA WC, PT VOIDED QS. PT IS ALERT
AND DENIES ANY C/O. HAS WRITTEN INSTRUCTIONS AND ALL PERSONAL
BELONGINGS. PT ESCORTED TO PRIVATE AUTO VIA WC BY NURSE WITH
FRIEND DRIVING HER HOME.

## 2019-02-08 NOTE — NUR
PT IS ALERT, DENIES ANY C/O PAIN OR NAUSEA. SINUS NORMA RATE IS 56.
DR LABOY HAS ROUNDED ON PT. FAMILY AT BEDSIDE.

## 2019-02-12 NOTE — OP
PATIENT NAME:  AMOS CANO                         MEDICAL RECORD: [id number]
:62                                             LOCATION:D.CAT          
                                                         ADMISSION DATE:        
SURGEON:  JEFF LABOY MD             
 
 
DATE OF OPERATION:  2019
 
PROCEDURES:
1.  Left heart catheterization.
2.  Selective coronary angiography.
3.  Left ventriculogram.
4.  Vein graft angiography.
5.  LIMA angiography.
 
INDICATION:  Angina and coronary artery disease.
 
PROCEDURE IN DETAIL:  After informed consent was obtained and after a detailed
description of risks, benefits as well as alternative therapies, the patient
elected proceed with angiogram and angioplasty.  The right femoral area was
prepped and draped in normal sterile fashion.  Right femoral artery was
cannulated via modified Seldinger technique with placement of 5-Czech sheath. 
All catheters exchanged through this sheath.
 
FINDINGS:  The left ventriculogram was performed in standard 30-degree CHOW view,
reveals good cardiac wall motion throughout all segments.  Overall ejection
fraction estimated at 50% to 55%.
 
SELECTIVE CORONARY ANGIOGRAPHY:
1.  Left main showed no significant angiographic disease.
2.  Left anterior descending has a total occlusion in the mid vessel.
3.  Vein graft LIMA to the LAD is widely patent.  Distal LAD is diffusely
diseased, but widely patent.
4.  The left circumflex is totally occluded mid vessel.
5.  Vein graft to the LAD, diagonal and the circumflex is widely patent,
previously placed stent is widely patent.
6.  The right coronary artery is with a 70% stenosis in mid vessel.  The vein
graft to the right coronary artery is widely patent.  Distal right coronary
artery is widely patent.
 
OVERALL IMPRESSION:  Wide patency of both vein grafts as well as the LIMA as
well as a previously placed stent.  Continue medical management of the coronary
artery disease and cardiac risk factors.
 
TRANSINT:[id number] Voice Confirmation ID: [id number] DOCUMENT ID: [id number]
                                           
                                           JEFF LABOY MD             
 
 
 
Electronically Signed by JEFF LABOY on 19 at 1045
CC:                                                             [phone]
DICTATION DATE: 19 1119     :     19 1221      DEP CLI 
                                                                      19
[address] [address]

## 2021-02-25 ENCOUNTER — HOSPITAL ENCOUNTER (OUTPATIENT)
Dept: HOSPITAL 84 - D.CATH | Age: 61
Discharge: HOME | End: 2021-02-25
Attending: INTERNAL MEDICINE
Payer: MEDICARE

## 2021-02-25 VITALS
HEIGHT: 68 IN | BODY MASS INDEX: 39.82 KG/M2 | WEIGHT: 262.75 LBS | HEIGHT: 68 IN | WEIGHT: 262.75 LBS | BODY MASS INDEX: 39.82 KG/M2

## 2021-02-25 VITALS — DIASTOLIC BLOOD PRESSURE: 90 MMHG | SYSTOLIC BLOOD PRESSURE: 131 MMHG

## 2021-02-25 DIAGNOSIS — R06.09: ICD-10-CM

## 2021-02-25 DIAGNOSIS — I25.118: ICD-10-CM

## 2021-02-25 DIAGNOSIS — R00.2: ICD-10-CM

## 2021-02-25 DIAGNOSIS — I48.91: ICD-10-CM

## 2021-02-25 DIAGNOSIS — E78.5: ICD-10-CM

## 2021-02-25 DIAGNOSIS — R94.39: ICD-10-CM

## 2021-02-25 DIAGNOSIS — R07.9: Primary | ICD-10-CM

## 2021-02-25 DIAGNOSIS — I10: ICD-10-CM

## 2021-02-25 LAB
ALT SERPL-CCNC: 24 U/L (ref 10–68)
ANION GAP SERPL CALC-SCNC: 11.9 MMOL/L (ref 8–16)
BUN SERPL-MCNC: 13 MG/DL (ref 7–18)
CALCIUM SERPL-MCNC: 8.8 MG/DL (ref 8.5–10.1)
CHLORIDE SERPL-SCNC: 102 MMOL/L (ref 98–107)
CHOLEST/HDLC SERPL: 2.8 RATIO (ref 2.3–4.1)
CO2 SERPL-SCNC: 28 MMOL/L (ref 21–32)
CREAT SERPL-MCNC: 1.2 MG/DL (ref 0.6–1.3)
ERYTHROCYTE [DISTWIDTH] IN BLOOD BY AUTOMATED COUNT: 14.4 % (ref 11.5–14.5)
GLUCOSE SERPL-MCNC: 100 MG/DL (ref 74–106)
HCT VFR BLD CALC: 40.5 % (ref 36–48)
HDLC SERPL-MCNC: 61 MG/DL (ref 32–96)
HGB BLD-MCNC: 13.1 G/DL (ref 12–16)
LDL-HDL RATIO: 1.5 RATIO (ref 1.5–3.5)
LDLC SERPL-MCNC: 92 MG/DL (ref 0–100)
LYMPHOCYTES NFR BLD AUTO: 31.4 % (ref 15–50)
MCH RBC QN AUTO: 29.2 PG (ref 26–34)
MCHC RBC AUTO-ENTMCNC: 32.3 G/DL (ref 31–37)
MCV RBC: 90.2 FL (ref 80–100)
NEUTROPHILS NFR BLD AUTO: 61.2 % (ref 40–80)
OSMOLALITY SERPL CALC.SUM OF ELEC: 275 MOSM/KG (ref 275–300)
PLATELET # BLD: 219 10X3/UL (ref 130–400)
PMV BLD AUTO: 8.6 FL (ref 7.4–10.4)
POTASSIUM SERPL-SCNC: 3.9 MMOL/L (ref 3.5–5.1)
RBC # BLD AUTO: 4.49 10X6/UL (ref 4–5.4)
SODIUM SERPL-SCNC: 138 MMOL/L (ref 136–145)
TRIGL SERPL-MCNC: 86 MG/DL (ref 30–200)
WBC # BLD AUTO: 5.8 10X3/UL (ref 4.8–10.8)

## 2021-02-25 NOTE — NUR
RIGHT GROIN DRESSING C/D/I. NO S/S OF HEMATOMA NOTED. VSS AT THIS
TIME. PT RESTING COMFORTABLY. DENIES NAUSEA/PAIN. RIGHT PEDAL PULSE
PALPABLE.

## 2021-02-25 NOTE — HEMODYNAMI
PATIENT:AMOS CANO                                MEDICAL RECORD: [id number]
: 62                                            LOCATION:D.CAT          
ACCT# [id number]                                       ADMISSION DATE: 21
 
 
 Generatedon:114:06
Patient name: AMOS CANO Patient #: [id number] Visit #: [id number] SSN: [id number]
-8320 :
1960 Date of study: 2021
Page: Of
Hemodynamic Procedure Report
****************************
Patient Data
Patient Demographics
Procedure consent was obtained
First Name: AMOS            Gender: Female
Last Name: JEROME            : 1960
[facility] Initial: JANETH      Age: 60 year(s)
Patient #: [id number]       Race: 
Visit #: [id number]
SSN: 
Accession #:
[id number]
Additional ID: [id number]
Contact details
Address: [address] [place] [address]  Phone: [phone]
State: [place]
City: [place]
Zip code: [address]
Past Medical History
Allergies: No known allergies
Admission
Admission Data
Admission Date: 2021   Admission Time: 10:47
Arrival Date: 2021     Arrival Time: 13:30
Admit Source: Other         Insurance Payor: Medicare,
Medicaid
[facility] #: [id number]
Height (in.): 67.72         BSA: 2.29 (m2)
Height (cm.): 172           BMI: 40.22 (kg/m2)
Weight (lbs.): 262.35
Weight (kg.): 119
Lab Results
Lab Result Date: 2021  Lab Result Time: 0:00
Biochemistry
Name         Units    Result                Min      Max
BUN          mg/dl    13       --(--*-)--   7        18
Creatinine   mg/dl    1.2      --(---*)--   0.6      1.3
CBC
Name         Units    Result                Min      Max
Hemoglobin   g/dl     13.1     -*(----)--   13.5     17.5
Procedure
Procedure Types
Cath Procedure
Diagnostic Procedure
LHC
LHC w/Coronaries w/Grafts
Sedation Charges
Moderate Sedation 25-39 minutes
 
PCI Procedure
Hemochron ACT Test
PTCA
PTCA Initial
Procedure Description
Procedure Date
Procedure Date: 2021
Procedure Start Time: 13:43
Procedure End Time: 14:01
Procedure Staff
Name                            Function
Franco Ceballos MD              Performing Physician
Janis Cottrell RT                    Monitor
Emily Ramirez RT                Scrub
Desmond Enrique RN              Nurse
Procedure Data
Cath Procedure
Fluoroscopy
Diagnostic fluoroscopy      Total fluoroscopy Time: 4.6
time: 4.6 min               min
Diagnostic fluoroscopy      Total fluoroscopy dose:
dose: 1616 mGy              1616 mGy
Contrast Material
Contrast Material Type                       Amount (ml)
Isovue 300                                   156
Entry Location
Entry     Primary  Successful  Side  Size  Upsize Upsize Entry    Closure Succes
sful  Closure
Location                             (Fr)  1 (Fr) 2 (Fr) Remarks  Device        
      Remarks
Femoral                        Right 5 Fr  6 Fr                   Exoseal
artery                                     Short
Estimated blood loss: 5 ml
Diagnostic catheters
Device Type               Used For           End Catheter
Placement
MULTIPACK JL 4.0 5Fr      Left Coronary
catheter                  Angiography
MULTIPACK 3DRC 5Fr        Right Coronary
catheter                  Angiography
MULTIPACK Pigtail 5 Fr    LV Angiography
catheter
Procedure Complications
No complications
Procedure Medications
Medication           Administration Route Dosage
0.9% NaCl            I.V.                 100 ml/hr
Oxygen               etCO2 Nasal cannula  2 l/min
Heparin Flush Bag    added to field       2 bags
(1000units/500ml NS)
Lidocaine 2%         added to field       20
Versed               I.V.                 2 mg
Fentanyl             I.V.                 100 mcg
Versed               I.V.                 1 mg
Versed               I.V.                 1 mg
Heparin Bolus        I.V.                 5000 units
Fentanyl             I.V.                 50 mcg
Fentanyl             I.V.                 50 mcg
Hemodynamics
Rest
 
BSA: 2.29 (m2) HGB: 13.1 (g/dl) O2 Consumption: Estimated: 215.85 (ml/min) O2 Co
nsumption
indexed: Estimated:94.26 (ml/min/m) Heart Rate: 68 (bpm)
Pressure Samples
Time     Site     Value (mmHg) Purpose      Heart      Use
Rate(bpm)
13:49    LV       121/17,5     Snapshot     71
Gradients
Valve  Time  Site Site Mean    SEP/DFP    Peak To Heart  Use
1    2    (mmHg)  (sec/min)  Peak    Rate
(mmHg)  (bpm)
Aortic 13:49 LV   AO                              79
Snapshots
Pre Cath      Intra         NCS           Post Cath
Vital Signs
Time     Heart  Resp   SPO2 etCO2   NIBP (mmHg)  Rhythm  Pain    Sedation
Rate   (ipm)  (%)  (mmHg)                       Status  Level
(bpm)
13:24:49 66     13     100  40      135/84(108)  NSR     0 (11)  10(A)
, No
pain
13:28:59 68     22     100  41.6    118/78(99)   NSR     0 (11)  10(A)
, No
pain
13:33:09 69     9      94   48.4    115/74(90)   NSR     0 (11)  10(A)
, No
pain
13:37:14 73     9      89   52.9    121/83(106)  NSR     0 (11)  10(A)
, No
pain
13:41:18 74     10     93   52.1    133/102(121) NSR     0 (11)  10(A)
, No
pain
13:45:28 71     11     89   53.6    144/96(127)  NSR     0 (11)  10(A)
, No
pain
13:49:38 72     11     95   51.3    132/93(121)  NSR     0 (11)  10(A)
, No
pain
13:53:50 73     10     94   48.3    134/83(113)  NSR     0 (11)  10(A)
, No
pain
13:58:02 72     11     95   49.8    131/86(120)  NSR     0 (11)  10(A)
, No
pain
Medications
Time     Medication       Route   Dose  Verified Delivered Reason          Notes
  Effectiveness
by       by
13:26:43 0.9% NaCl        I.V.    100   Desmond  Desmond   Per physician
ml/hr Iva Enrique
RN       RN
13:26:53 Oxygen           etCO2   2     Desmond  Desmond   for low 02 sats
Nasal   l/min Iva Enrique
cannula       RN       RN
13:27:04 Heparin Flush    added   2     Desmond  Desmond   used for
Bag              to      bags  Iva Enrique   procedure
(1000units/500ml field         RN       RN
NS)
13:27:14 Lidocaine 2%     added   20ml  Desmond  Desmond   for local
 
to      vial  Iva Enrique   anesthetic
field         RN       RN
13:30:27 Versed           I.V.    2 mg  Desmond  Desmond   for sedation
Iva Enrique
RN       RN
13:30:36 Fentanyl         I.V.    100   Desmond  Desmond   for sedation
mcg   Iva Enrique
RN       RN
13:36:12 Versed           I.V.    1 mg  Desmond  Desmond   for sedation
Iva Enrique
RN       RN
13:39:25 Versed           I.V.    1 mg  Desmond  Desmond   for sedation
Iva Enrique
RN       RN
13:54:55 Heparin Bolus    I.V.    5000  Desmond  Desmond   for
units Iva aguirre
RN       RN
13:55:05 Fentanyl         I.V.    50    Desmond Logan   for sedation
mcg   Iva Enrique RN       RN
14:01:16 Fentanyl         I.V.    50    Desmond Logan   for sedation
mcg   Iva Enrique RN       RN
Procedure Log
Time     Note
12:42:27 Informed consent obtained and on chart
12:43:07 Diagnostic Cath Status : Elective
12:47:14 Admit Source: Other
12:47:17 Arrival Date: 2021 1:30:00 PM
12:49:03 Insurance Payor : Medicare, Medicaid
12:50:35 Patient Height : 67.72 inches
12:50:37 Patient Weight : 262.35 lbs
12:51:32 Lab Result : Hemoglobin 13.1 g/dl
12:51:32 Lab Result : Creatinine 1.2 mg/dl
12:51:32 Lab Result : BUN 13 mg/dl
12:51:41 Procedure Status Elective Heart Cath (OP).
12:51:44 Desmond Enrique RN sent for patient. Start room use.
12:51:45 Time tracking: Regular hours (M-F 7:00 - 5:00)
12:51:49 Plan of Care:Hemodynamics will remain stable., Cardiac
rhythm will remain stable., Comfort level will be
maintained., Respiratory function will remain
adequate., Patient/ family verbilizes understanding of
procedure., Procedure tolerated without complication.,
Recovers from procedure without complications..
13:17:46 Patient received from Pre/Post Procedure Room to CCL 2
Alert and oriented. Tansferred to table in Supine
position.
13:17:47 Warm blankets applied, and akash hugger turned on for
patient comfort.
13:17:48 Correct patient and procedure confirmed by team.
13:17:49 ECG and BP/O2 sat monitors applied to patient.
13:23:46 Vital chart was started
13:23:47 Baseline sample Acquired.
13:23:56 Rhythm: sinus rhythm
13:24:02 H&P Date Dictated: 2021 New H&P dictated by
physician..
13:24:04 Pre-procedure instructions explained to patient.
13:24:04 Pre-op teaching completed and patient verbalized
understanding.
13:24:07 Family in waiting room.
 
13:26:43 0.9% NaCl 100 ml/hr I.V. was administered by Desmond Enrique RN; Per physician; Verbal order read back and
verified.
13:26:53 Oxygen 2 l/min etCO2 Nasal cannula was administered by
Desmond Enrique RN; for low 02 sats; Verbal order read
back and verified.
13:27:04 Heparin Flush Bag (1000units/500ml NS) 2 bags added to
field was administered by Desmond Enrique RN; used for
procedure; Verbal order read back and verified.
13:27:14 Lidocaine 2% 20ml vial added to field was administered
by Desmond Enrique RN; for local anesthetic; Verbal
order read back and verified.
13:27:45 Is the patient allergic to Iodine/contrast media? No.
13:27:47 Was the patient premedicated? Yes
13:27:49 Is patient on blood thinner?No
13:27:50 Patient diabetic? No.
13:27:53 Previous problem with sedation/anesthesia? No ?
13:27:57 Snore? No
13:27:58 Sleep apnea? No
13:27:59 Deviated septum? No
13:28:00 Opens mouth fully? Yes
13:28:00 Sticks out tongue? Yes
13:28:02 Airway obstruction? No ?
13:28:04 Dentures? No ?
13:28:09 Pre procedure: right dorsailis pedis pulse 2+ Normal;
easily identifiable; not easily obliterated
13:28:11 Pre procedure: left dorsailis pedis pulse 2+ Normal;
easily identifiable; not easily obliterated
13:28:13 Patient pain scale 0/10 ?.
13:28:22 IV patent on arrival in right antecubital with 0.9%
NaCl at [facility].
13:28:25 Lab results completed and on chart.
13:28:29 Right groin area was prepped with chlora-prep and
draped in sterile fashion
13:28:30 Alarms reviewed by R. N.
13:28:30 Sharps counted by scrub and verified by R.N.
13:29:26 Physician arrived
13:29:26 --------ALL STOP TIME OUT------
13:29:27 Final Timeout: patient, procedure, and site verified
with staff and physician. All members of the team are
in agreement.
13:29:29 Right groin site verified by team.
13:29:45 Fire Safety Assessment: A--An alcohol-based skin
anteseptic being used preoperatively., C--Open oxygen
or nitrous oxide is being used., D--An ESU, laser, or
fiber-optic light is being used.
13:29:49 Physical assessment completed. ASA score P 2 - A
patient with mild systemic disease as per Franco Ceballos MD.
13:30:27 Versed 2 mg I.V. was administered by Desmond Enrique
RN; for sedation; Verbal order read back and verified.
13:30:36 Fentanyl 100 mcg I.V. was administered by Desmond Enrique RN; for sedation; Verbal order read back and
verified.
13:30:42 3a) 45-59 Moderately reduced kidney function.
13:31:36 Maximum allowable contrast dose (3.7 X eGFR X 0.75)139
ml.
13:31:46 Sedation plan: IV Moderate Sedation Medication:Versed,
Fentanyl
13:31:52 Use device set Femoral Dx
 
13:32:03 ACIST Syringe (18300) opened to sterile field.
13:32:03 Bag Decanter (2002S) opened to sterile field.
13:32:04 Medline Cath Pack ([id number]) opened to sterile field.
13:32:05 ACIST Hand Control (42801) opened to sterile field.
13:32:05 ACIST Manifold (80206) opened to sterile field.
13:32:06 DIAGNOSTIC Multipack 5Fr catheter set ([id number]) opened
to sterile field.
13:32:06 Tegaderm 4 x 4 (1626W) opened to sterile field.
13:32:08 SHEATH 5FR Laporte (RMP858) opened to sterile field.
13:32:08 EMERALD Guide Wire (673-927) opened to sterile field.
13:33:18 Procedure started.
13:33:20 Full Disclosure recording started
13:33:26 Zero performed for pressure channel P1
13:33:41 Zero performed for pressure channel P1
13:33:51 Zero performed for pressure channel P1
13:34:58 Zero performed for pressure channel P1
13:36:12 Versed 1 mg I.V. was administered by Desmond Enrique RN; for sedation; Verbal order read back and verified.
13:39:25 Versed 1 mg I.V. was administered by Desmond Enrique RN; for sedation; Verbal order read back and verified.
13:43:12 Local anesthetic to right femoral artery with
Lidocaine 2% by Franco Ceballos MD.**INITIAL ACCESS
ONLY**
13:43:20 A 5 Fr sheath was inserted into the Right Femoral
artery
13:43:42 A MULTIPACK JL 4.0 5Fr catheter was advanced over the
wire and used for Left Coronary Angiography.
13:43:46 LCA angiography performed.
13:43:48 Injector settings: Ml/sec: 3, Volume: 6,
13:43:57 Catheter removed.
13:44:02 A MULTIPACK 3DRC 5Fr catheter was advanced over the
wire and used for Right Coronary Angiography.
13:44:04 RCA angiography performed.
13:44:07 Injector settings: Ml/sec: 3, Volume: 6,
13:44:18 SVG to RCA angiography performed.
13:45:16 SVG to OM angiography performed.
13:47:01 LIMA angiography performed.
13:47:57 Catheter removed.
13:48:08 A MULTIPACK Pigtail 5 Fr catheter was advanced over
the wire and used for LV Angiography.
13:48:33 BMW 300cm Universal 2 J wire ([id number]) opened to
sterile field.
13:48:34 INFLATOR Merit BasixCompak (FT6446) opened to sterile
field.
13:48:34 SHEATH 6FR Laporte (FGP176) opened to sterile field.
13:49:11 LV hemodynamics recorded.
13:49:12 LV gram done using CHOW
13:49:14 Injector settings: Ml/sec: 5, Volume: 15,
13:49:29 EF : 55 %
13:49:50 Catheter removed.
13:49:50 Proceeding to intervention.
13:49:57 Sheath upsized to a 6 Fr Short.
13:50:02 **ACC** Pre-intervention BETSY Flow is 3.
13:50:20 Pre PCI Site: Native mLAD has 80% stenosis.
13:50:27 6 Fr xblad 3.5 guide catheter was inserted over the
wire
13:50:41 GUIDE 6FR XBLAD 3.5 catheter ([id number]) opened to
sterile field.
13:51:08 bmw wire advanced.
13:53:58 Wire advanced across lesion.
 
13:54:55 Heparin Bolus 5000 units I.V. was administered by
Desmond Enrique RN; for anticoagulation; Verbal order
read back and verified.
13:55:05 Fentanyl 50 mcg I.V. was administered by Desmond Enrique RN; for sedation; Verbal order read back and
verified.
13:56:22 Inflate balloon Inflation number: 1 A EMERGE OTW 3.0 x
15 balloon ([id number]) was prepped and advanced
across the Mid LAD 80, then inflated to 12 MONALISA for
0:30 (min:sec) 0.
13:57:05 Inflation number: 2 The EMERGE OTW 3.0 x 15 balloon
([id number]) was reinflated across the Mid LAD 0, to
16 MONALISA for 0:30 (min:sec) .
13:57:38 Balloon removed over the wire.
13:57:39 Wire removed.
13:57:40 Guide catheter removed.
13:57:47 EXOSEAL 6Fr () opened to sterile field.
13:58:06 Sheath removed intact; hemostasis achieved with
Exoseal to the Right Femoral artery.
13:58:07 Procedure ended.(Physican Out)
13:58:27 Fluoroscopy time 04.60 minutes.
13:58:31 Flurop Dose total: 1616
13:58:31 Fluoroscopy dose: 1616 mGy
13:58:50 Dose Area Product 94830 mGy/cm.
13:58:55 Contrast amount:Isovue 300 156ml.
13:58:57 Maximum allowable dose exceeded? Yes.
13:58:58 Sharps counted by scrub and verified by R.N.
13:59:07 Insertion/operative site no bleeding no hematoma.
13:59:09 Post-op/insertion site Right Femoral artery dressed
using a 4 x 4 and Tegaderm.
13:59:41 Post right femoral artery:stable
13:59:42 Post Procedure Pulses reassessed and unchanged
13:59:45 Post procedure rhythm: unchanged.
13:59:47 Estimated blood loss: 5 ml
13:59:49 Post procedure instruction explained to
patient.Patient verbalizes understanding.
13:59:52 Patient needs reinforcement of post procedure
teaching.
14:00:04 Procedure type changed to Cath procedure, Diagnostic
procedure, LHC, LHC w/Coronaries w/Grafts, Sedation
Charges, Moderate Sedation 25-39 minutes, PCI
procedure, Hemochron ACT Test, PTCA, PTCA Initial
14:00:30 Procedure and supply charges have been captured,
reviewed, submitted and are correct.
14:00:34 Procedure Complication : No complications
14:00:41 Vital chart was stopped
14:00:42 [facility] Findings: MVD- PCI performed (see procedure note)
14:00:44 Operative report dictated upon procedure completion.
14:00:44 See physician's report for complete and final results.
14:00:49 Report given to Pre/Post Procedure Room.
14:00:52 Patient transfered to Pre/Post Procedure Room with
Stretcher.
14:01:01 Procedure ended.
14:01:01 Full Disclosure recording stopped
14:01:11 **ACC-PCI Only** Patient was given prescriptions, or
instructed by Franco Ceballos MD to start/continue the
following medications upon discharge: Plavix
14:01:13 End room use (Document Last)
14:01:16 Fentanyl 50 mcg I.V. was administered by Desmond Enrique RN; for sedation; Verbal order read back and
 
verified.
14:02:08 End room use (Document Last)
14:02:25 End room use (Document Last)
14:03:06 ACT drawn and resulted at 177 seconds. (normal
therapeutic range 180-240 seconds).
Intervention Summary
Intervention Notes
Time     ActionType  Lesion and  Equipment    Action#  Pressure  Duration
Attributes  Used
13:56:22 Inflate     Mid LAD     EMERGE OTW   1        12        00:30
balloon                 3.0 x 15
balloon
([id number])
13:57:05 Reinflate   Mid LAD     EMERGE OTW   2        16        00:30
balloon                 3.0 x 15
balloon
([id number])
Device Usage
Item Name    Manufacture  Quantity  Catalog Number  Hospital Part    Current Min
imal Lot# /
Charge   Number  Stock   Stock   Serial#
Code
ACIST        Acist        1         69043           720396   331865  337111  20
Syringe      Medical
(09465)      Systems Inc
Bag Decanter Microtek     1                    708509   44175   324343  5
()      Medical Inc.
Medline Cath Medline      1         [id number]       770286   63541   537346  5
Pack
([id number])
ACIST Hand   Acist        1         10411           165899   069855  249099  5
Control      Medical
(15700)      Systems Inc
ACIST        Acist        1         48443           975625   918430  403950  5
Manifold     Medical
(15599)      Systems Inc
DIAGNOSTIC   Cardinal     1         [id number]          262476   42371   078597  30
Code Kingdoms    Health
5Fr catheter
set (SA3629)
Tegaderm 4 x 3M           1         1626W           291824   084781  607149  5
4 (1626W)
SHEATH 5FR   Terumo       1         OPV606          965460   414408  738262  5
Laporte
(QYD964)
EMERALD      Cardinal     1         502455         392333   816458  936645  5
Guide Wire   Health
(502455)
MULTIPACK JL Cardinal     1                                          112581  5
4.0 5Fr      Health
catheter
MULTIPACK    Cardinal     1                                          443358  5
3DRC 5Fr     Health
catheter
MULTIPACK    Cardinal     1                                          931868  5
Pigtail 5 Fr Health
catheter
BMW 300cm    Christiansen       1         [id number]        826678   131729  038566  5
[place] 2  Vascular
J wire
 
(5137593O)
INFLATOR     Merit        1         JE4508          025562   963759  626941  15
Merit        Medical
BasixCompak
(XX8488)
SHEATH 6FR   Terumo       1         JSO197          858945   314279  179046  40
Laporte
(DWR165)
GUIDE 6FR    Cardinal     1         [id number]        959497   618295  622208  10
XBLAD 3.5    Health
catheter
(17861668)
EMERGE OTW   [place]       1         [id number]  804615   441029  199619  5  
     [id number]
3.0 x 15     Scientific
balloon
([id number])
EXOSEAL 6Fr  Cardinal     1                    618279   984174  628213  10
()      Health
Signature Audit [place]
Stage           Time        Signature      Unsigned
Intra-Procedure 2021   Janis Cottrell
2:02:08 PM  RT(R)
Intra-Procedure 2021   Desmond
2:02:25 PM  Iva CASTELLANOS
Intra-Procedure 2021   Franco Palacios
2:06:20 PM  Lalo SOUZA
Signatures
Performing Physician :  Signature :
Franco Ceballos MD      ______________________________
Date : ______________ Time :
______________
Monitor : Janis Cottrell RT   Signature :
______________________________
Date : ______________ Time :
______________
Nurse : Desmond Lorigan  Signature :
RN                       ______________________________
Date : ______________ Time :
______________
 
 
 
 
 
 
 
 
 
 
 
 
 
 
 
[address] [place] [address]

## 2021-02-25 NOTE — NUR
RIGHT GROIN DRESSING C/D/I. NO S/S OF HEMATOMA NOTED. PIV D/C'D WITH
CATH TIP INTACT. PT INSTRUCTED TO GET UP AND DRESSED AT THIS TIME.

## 2021-02-25 NOTE — NUR
PT ARRIVED BY STRETCHER. PLACED ON MONITORS. ASSESSMENT COMPLETED.
VSS AT THIS TIME. CALL LIGHT WITHIN REACH. PT RESTING COMFORTABLY.

## 2021-02-25 NOTE — NUR
RIGHT GROIN DRESSING C/D/I. NO S/S OF HEMATOMA NOTED. HEAD OF BED INC
TO 30 DEGREES. TOLERATED WELL. VSS AT THIS TIME. SET UP WITH SANDWICH
TRAY AND DRINK.

## 2021-02-25 NOTE — NUR
DISCUSSED DISCHARGE INSTRUCTIONS WITH PT. SHE VOICED UNDERSTANDING.
RIGHT GROIN DRESSING C/D/I. NO S/S OF HEMATOMA NOTED. PT AMBULATED TO
RESTROOM AND VOIDED WITHOUT DIFFICULTY. STEADY GAIT NOTED.

## 2021-02-25 NOTE — NUR
PT RESTING COMFORTABLY. VSS AT THIS TIME. CALL LIGHT WITHIN REACH. PT
DENIES NAUSEA/PAIN. TOLERATING SIPS OF SODA. RIGHT PEDAL PULSE
PALPABLE.

## 2021-03-01 NOTE — OP
PATIENT NAME:  AMOS CANO                         MEDICAL RECORD: [id number]
:62                                             LOCATION:D.CAT          
                                                         ADMISSION DATE:        
SURGEON:  SHAE MEMBRENO MD          
 
 
DATE OF OPERATION:  2021
 
PROCEDURE:  Left heart catheterization, selective coronary angiography plus
bypass graft plus PTCA stent to LAD, right femoral artery approach.
 
CATHETERS:  A 5-Hebrew sheath, 5/4 left and right Ann Marie, 5/4 pig.  The
procedure was well tolerated.  The patient was returned to the schaefer.  Sheath
removed.  ExoSeal device was placed.
 
FINDINGS:  Left ventriculography in 30-degree CHOW view:  Normal wall motion,
normal systolic function.
 
CORONARY ANATOMY:
1.  Left main:  Left main free of disease.
2.  LAD has 2 discrete areas of restenosis, 80%, inside the previously placed
stent.
3.  Circumflex is totally occluded.
4.  Right coronary: Right coronary has about an 80% elongated stenosis.
5.  Bypass graft, LIMA to LAD:  This is atretic at this point with no distal
runoff.  Bypass graft, skip graft to the circ and diagonal.  This is widely
patent throughout its course.  6.  Saphenous vein graft to the right again is
widely patent throughout its course with post-anastomotic stenosis.
 
PLAN:  Intervention to the LAD momentarily.
 
DESCRIPTION:  A 5-Hebrew sheath was exchanged for a 6-Hebrew sheath.  XB-LAD
guide catheter provided excellent guide catheter support.  A 3.0 balloon was
used distally to address the 80% stenosis up to 10 atmospheres and proximally up
to 16 atmospheres.  Final angiography shows excellent resolution of two discrete
80% stenosis, no significant residual.  BETSY flow was 3 throughout the
procedure.  Heparin and Integrilin were used during the case.  Sheath closed
with ExoSeal device.  Plavix was loaded in the lab.
 
TRANSINT:[id number] Voice Confirmation ID: [id number] DOCUMENT ID: [id number]
                                           
                                           SHAE MEMBRENO MD          
 
 
 
Electronically Signed by SHAE MEMBRENO on 21 at 0850
 
 
 
 
CC:                                                             [id number]
DICTATION DATE: 21 1406     :     21 1653      [facility] 
                                                                      [address]
[place] [address]

## 2021-03-23 ENCOUNTER — HOSPITAL ENCOUNTER (OUTPATIENT)
Dept: HOSPITAL 84 - D.CT | Age: 61
Discharge: HOME | End: 2021-03-23
Attending: FAMILY MEDICINE
Payer: MEDICARE

## 2021-03-23 VITALS — BODY MASS INDEX: 39.9 KG/M2

## 2021-03-23 DIAGNOSIS — R10.12: ICD-10-CM

## 2021-03-23 DIAGNOSIS — R10.31: Primary | ICD-10-CM

## 2021-06-29 ENCOUNTER — HOSPITAL ENCOUNTER (OUTPATIENT)
Dept: HOSPITAL 84 - D.OPS | Age: 61
Discharge: HOME | End: 2021-06-29
Attending: SURGERY
Payer: MEDICARE

## 2021-06-29 VITALS — WEIGHT: 255 LBS | HEIGHT: 68 IN | BODY MASS INDEX: 38.65 KG/M2

## 2021-06-29 VITALS — SYSTOLIC BLOOD PRESSURE: 108 MMHG | DIASTOLIC BLOOD PRESSURE: 62 MMHG

## 2021-06-29 DIAGNOSIS — E78.00: ICD-10-CM

## 2021-06-29 DIAGNOSIS — K80.20: Primary | ICD-10-CM

## 2021-06-29 DIAGNOSIS — I25.10: ICD-10-CM

## 2021-06-29 DIAGNOSIS — K44.9: ICD-10-CM

## 2021-06-29 DIAGNOSIS — I10: ICD-10-CM

## 2021-06-29 DIAGNOSIS — K21.9: ICD-10-CM

## 2021-06-29 LAB
ANION GAP SERPL CALC-SCNC: 11.2 MMOL/L (ref 8–16)
BASOPHILS NFR BLD AUTO: 0.9 % (ref 0–2)
BUN SERPL-MCNC: 12 MG/DL (ref 7–18)
CALCIUM SERPL-MCNC: 8.3 MG/DL (ref 8.5–10.1)
CHLORIDE SERPL-SCNC: 104 MMOL/L (ref 98–107)
CO2 SERPL-SCNC: 29.5 MMOL/L (ref 21–32)
CREAT SERPL-MCNC: 1.4 MG/DL (ref 0.6–1.3)
EOSINOPHIL NFR BLD: 2 % (ref 0–7)
ERYTHROCYTE [DISTWIDTH] IN BLOOD BY AUTOMATED COUNT: 13.5 % (ref 11.5–14.5)
GLUCOSE SERPL-MCNC: 97 MG/DL (ref 74–106)
HCT VFR BLD CALC: 39.5 % (ref 36–48)
HGB BLD-MCNC: 12.9 G/DL (ref 12–16)
LYMPHOCYTES # BLD: 2 10X3/UL (ref 1.18–3.74)
LYMPHOCYTES NFR BLD AUTO: 29.3 % (ref 15–50)
MCH RBC QN AUTO: 28.6 PG (ref 26–34)
MCHC RBC AUTO-ENTMCNC: 32.8 G/DL (ref 31–37)
MCV RBC: 87.3 FL (ref 80–100)
MONOCYTES NFR BLD: 6.8 % (ref 2–11)
NEUTROPHILS # BLD: 4.1 10X3/UL (ref 1.56–6.13)
NEUTROPHILS NFR BLD AUTO: 61 % (ref 40–80)
OSMOLALITY SERPL CALC.SUM OF ELEC: 280 MOSM/KG (ref 275–300)
PLATELET # BLD: 242 10X3/UL (ref 130–400)
PMV BLD AUTO: 7.3 FL (ref 7.4–10.4)
POTASSIUM SERPL-SCNC: 3.7 MMOL/L (ref 3.5–5.1)
RBC # BLD AUTO: 4.52 10X6/UL (ref 4–5.4)
SODIUM SERPL-SCNC: 141 MMOL/L (ref 136–145)
WBC # BLD AUTO: 6.7 10X3/UL (ref 4.8–10.8)

## 2021-06-30 NOTE — OP
PATIENT NAME:  AMOS CANO                         MEDICAL RECORD: [id number]
:62                                             LOCATION:D.OPS          
                                                         ADMISSION DATE:        
SURGEON:  OTTONIEL AGUILAR MD          
 
 
DATE OF OPERATION:  2021
 
PREOPERATIVE DIAGNOSES:
1.  Gallstones.
2.  Coronary artery disease.
3.  Gastroesophageal reflux disease.
 
POSTOPERATIVE DIAGNOSES:
1.  Gallstones.
2.  Coronary artery disease.
3.  Gastroesophageal reflux disease.
 
PROCEDURE:  Laparoscopic cholecystectomy.
 
SURGEON:  Ottoniel Aguilar MD
 
DESCRIPTION OF PROCEDURE:  The patient's abdomen was prepped and draped in
sterile fashion.  A cutdown was made on the superior aspect of the umbilicus, 0
Vicryls were placed in the fascia bilaterally and the fascia was incised with a
15-blade.  I then bluntly entered the peritoneal cavity and placed a 12-mm
Dominick port.  Under direct visualization, a 5-mm trocar was placed in the
epigastrium and two more 5-mm trocars were placed in the right subcostal region.
 The gallbladder was grasped and elevated.  The fatty adhesions were teased down
with blunt dissection.  The cystic artery and cystic duct were dissected free
and they were clipped proximally and distally and ligated in standard fashion. 
The gallbladder was taken off the liver bed using electrocautery and placed into
the right upper quadrant.  Any bleeding from the liver bed was then treated with
electrocautery.  The ports and insufflation were then removed and the
gallbladder was taken out through the umbilicus.  The umbilical fascia was
closed with interrupted 0 Vicryls times 3.  Wounds were then irrigated out with
normal saline, infused with 10 mL of 0.25% Marcaine with epinephrine.  The skin
incisions were closed with subcutaneous 5-0 Monocryl and dressed appropriately.
 
COMPLICATIONS:  None.
 
CONDITION:  Stable.
 
ANESTHESIA:  General endotracheal and local.
 
BLOOD LOSS:  Minimal.
 
TRANSINT:[id number] Voice Confirmation ID: [id number] DOCUMENT ID: [id number]
                                           
                                           OTTONIEL AGUILAR MD          
 
 
 
Electronically Signed by OTTONIEL AGUILAR on 21 at 1939
CC: LIBRADO JAMES MD                                       [id number]
DICTATION DATE: 21 09     :     21 1137      [facility] 
                                                                      [id number] [place] [address]